# Patient Record
Sex: MALE
[De-identification: names, ages, dates, MRNs, and addresses within clinical notes are randomized per-mention and may not be internally consistent; named-entity substitution may affect disease eponyms.]

---

## 2021-07-21 ENCOUNTER — TRANSCRIPTION ENCOUNTER (OUTPATIENT)
Age: 56
End: 2021-07-21

## 2021-11-13 ENCOUNTER — NON-APPOINTMENT (OUTPATIENT)
Age: 56
End: 2021-11-13

## 2021-11-17 ENCOUNTER — TRANSCRIPTION ENCOUNTER (OUTPATIENT)
Age: 56
End: 2021-11-17

## 2021-11-17 ENCOUNTER — APPOINTMENT (OUTPATIENT)
Dept: CARDIOLOGY | Facility: CLINIC | Age: 56
End: 2021-11-17
Payer: COMMERCIAL

## 2021-11-17 ENCOUNTER — NON-APPOINTMENT (OUTPATIENT)
Age: 56
End: 2021-11-17

## 2021-11-17 VITALS
WEIGHT: 86 LBS | SYSTOLIC BLOOD PRESSURE: 130 MMHG | HEIGHT: 60 IN | DIASTOLIC BLOOD PRESSURE: 90 MMHG | OXYGEN SATURATION: 98 % | BODY MASS INDEX: 16.88 KG/M2 | HEART RATE: 114 BPM

## 2021-11-17 DIAGNOSIS — Z00.00 ENCOUNTER FOR GENERAL ADULT MEDICAL EXAMINATION W/OUT ABNORMAL FINDINGS: ICD-10-CM

## 2021-11-17 PROCEDURE — 99243 OFF/OP CNSLTJ NEW/EST LOW 30: CPT

## 2021-11-17 PROCEDURE — 93242 EXT ECG>48HR<7D RECORDING: CPT

## 2021-11-17 PROCEDURE — 93000 ELECTROCARDIOGRAM COMPLETE: CPT | Mod: 59

## 2021-11-17 RX ORDER — VALSARTAN 80 MG/1
80 TABLET, COATED ORAL DAILY
Refills: 0 | Status: ACTIVE | COMMUNITY

## 2021-11-17 RX ORDER — ATORVASTATIN CALCIUM 10 MG/1
10 TABLET, FILM COATED ORAL
Refills: 0 | Status: ACTIVE | COMMUNITY

## 2021-11-17 NOTE — PHYSICAL EXAM
[General Appearance - Well Developed] : well developed [Normal Appearance] : normal appearance [Well Groomed] : well groomed [General Appearance - Well Nourished] : well nourished [No Deformities] : no deformities [General Appearance - In No Acute Distress] : no acute distress [Normal Conjunctiva] : the conjunctiva exhibited no abnormalities [Eyelids - No Xanthelasma] : the eyelids demonstrated no xanthelasmas [Normal Oral Mucosa] : normal oral mucosa [No Oral Pallor] : no oral pallor [No Oral Cyanosis] : no oral cyanosis [Normal Jugular Venous A Waves Present] : normal jugular venous A waves present [Normal Jugular Venous V Waves Present] : normal jugular venous V waves present [No Jugular Venous Acuna A Waves] : no jugular venous acuna A waves [Respiration, Rhythm And Depth] : normal respiratory rhythm and effort [Exaggerated Use Of Accessory Muscles For Inspiration] : no accessory muscle use [Auscultation Breath Sounds / Voice Sounds] : lungs were clear to auscultation bilaterally [Heart Rate And Rhythm] : heart rate and rhythm were normal [Heart Sounds] : normal S1 and S2 [Murmurs] : no murmurs present [Abdomen Soft] : soft [Abdomen Tenderness] : non-tender [Abdomen Mass (___ Cm)] : no abdominal mass palpated [Abnormal Walk] : normal gait [Gait - Sufficient For Exercise Testing] : the gait was sufficient for exercise testing [Nail Clubbing] : no clubbing of the fingernails [Cyanosis, Localized] : no localized cyanosis [Petechial Hemorrhages (___cm)] : no petechial hemorrhages [Skin Color & Pigmentation] : normal skin color and pigmentation [] : no rash [No Venous Stasis] : no venous stasis [Skin Lesions] : no skin lesions [No Skin Ulcers] : no skin ulcer [No Xanthoma] : no  xanthoma was observed [Oriented To Time, Place, And Person] : oriented to person, place, and time [Affect] : the affect was normal [Mood] : the mood was normal [No Anxiety] : not feeling anxious

## 2021-11-30 ENCOUNTER — APPOINTMENT (OUTPATIENT)
Dept: CARDIOLOGY | Facility: CLINIC | Age: 56
End: 2021-11-30
Payer: COMMERCIAL

## 2021-11-30 PROCEDURE — 93306 TTE W/DOPPLER COMPLETE: CPT

## 2021-12-07 NOTE — HISTORY OF PRESENT ILLNESS
[Preoperative Visit] : for a medical evaluation prior to surgery [Scheduled Procedure ___] : a [unfilled] [Surgeon Name ___] : surgeon: [unfilled] [Good] : Good [Electrocardiogram] : ~T an ECG ~C was performed [Echocardiogram] : ~T an echocardiogram ~C was performed [Fair] : Fair [FreeTextEntry1] : 56-year-old male referred for complaints of tachyarrhythmias.  Patient has known history of hypertension, hyperlipidemia.  History of lithotripsy in July, 2021.  Nondrinker, non-smoker.  Denies any history of chest pain, dyspnea on exertion, diaphoretic spells, syncopal episodes.  No prior history of coronary disease.  States that his most recent labs were reportedly normal.  Has been on statins and angiotensin receptor blockers for a while.  Family history positive for grandmother who had myocardial infarction at the age of 71.

## 2021-12-07 NOTE — DISCUSSION/SUMMARY
[Procedure Low Risk] : the procedure risk is low [As per surgery] : as per surgery [Continue] : Continue medications as currently directed [Patient Low Risk] : the patient is a low surgical risk [Optimized for Surgery] : the patient is optimized for surgery [FreeTextEntry1] : Sinus tachycardia noted on routine electrocardiogram in 56-year-old male with history of hypertension and hyperlipidemia.  EKG shows no evidence of ischemia.  Hemodynamically stable otherwise.  No cardiac contraindications to planned low risk surgical intervention.  \par \par Holter monitor showed brief SVT but otherwise normal HR's (SR with mean Hr of 96 bpm).  Labs recently done by primary care physician were reviewed with the patient and show a normal CBC SMA-7 noteworthy for borderline elevation of creatinine, minimally elevated LFTs no thyroid function available for review.  \par \par Suggest assess thyroid function to rule out hyperthyroidism as cause of tachyarrhythmias.  Given age and multiple cardiac risk factors will request exercise nuclear stress test to rule out occult ischemic heart disease.

## 2021-12-15 ENCOUNTER — APPOINTMENT (OUTPATIENT)
Dept: CARDIOLOGY | Facility: CLINIC | Age: 56
End: 2021-12-15
Payer: COMMERCIAL

## 2021-12-15 PROCEDURE — 93015 CV STRESS TEST SUPVJ I&R: CPT

## 2021-12-15 PROCEDURE — 78452 HT MUSCLE IMAGE SPECT MULT: CPT

## 2021-12-15 PROCEDURE — A9500: CPT

## 2022-04-14 ENCOUNTER — APPOINTMENT (OUTPATIENT)
Dept: CARDIOLOGY | Facility: CLINIC | Age: 57
End: 2022-04-14
Payer: COMMERCIAL

## 2022-04-14 ENCOUNTER — NON-APPOINTMENT (OUTPATIENT)
Age: 57
End: 2022-04-14

## 2022-04-14 VITALS
OXYGEN SATURATION: 97 % | HEIGHT: 60 IN | BODY MASS INDEX: 17.47 KG/M2 | DIASTOLIC BLOOD PRESSURE: 88 MMHG | SYSTOLIC BLOOD PRESSURE: 100 MMHG | WEIGHT: 89 LBS | HEART RATE: 108 BPM

## 2022-04-14 PROCEDURE — 99213 OFFICE O/P EST LOW 20 MIN: CPT

## 2022-04-14 PROCEDURE — 93000 ELECTROCARDIOGRAM COMPLETE: CPT

## 2022-04-14 NOTE — DISCUSSION/SUMMARY
[___ Year(s)] : in [unfilled] year(s) [FreeTextEntry1] : Holter monitor showed brief SVT but otherwise normal HR's (SR with mean Hr of 96 bpm).  Short UT noted on serial ecg's but no evidence of SVT's. \par \par Followuip yearly, if palpitations increase may need lop recorder.

## 2022-04-14 NOTE — CARDIOLOGY SUMMARY
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  -Left atrial enlargement.  [de-identified] : 11/17/2021, no evidence of palpitations.  Holter demonstrated sinus rhythm with isolated brief supraventricular tachyarrhythmia (asymptomatic). [de-identified] : 12/15/2021,normal exercise tolerance.  Normal exercise nuclear stress test. [de-identified] : 11/30/2021, ejection fraction 68%, mild concentric remodeling of the left ventricle, no significant valvular heart disease.

## 2022-04-14 NOTE — HISTORY OF PRESENT ILLNESS
[FreeTextEntry1] : 56-year-old male seen previously for complaints of tachyarrhythmias.  Patient has known history of hypertension, hyperlipidemia.  History of lithotripsy in July, 2021.  Nondrinker, non-smoker.  Denies any history of chest pain, dyspnea on exertion, diaphoretic spells, syncopal episodes.  No prior history of coronary disease.  Family history positive for grandmother who had myocardial infarction at the age of 71.

## 2023-04-17 ENCOUNTER — APPOINTMENT (OUTPATIENT)
Dept: CARDIOLOGY | Facility: CLINIC | Age: 58
End: 2023-04-17
Payer: COMMERCIAL

## 2023-04-17 ENCOUNTER — NON-APPOINTMENT (OUTPATIENT)
Age: 58
End: 2023-04-17

## 2023-04-17 VITALS
OXYGEN SATURATION: 98 % | HEIGHT: 60 IN | HEART RATE: 94 BPM | DIASTOLIC BLOOD PRESSURE: 70 MMHG | WEIGHT: 87 LBS | BODY MASS INDEX: 17.08 KG/M2 | SYSTOLIC BLOOD PRESSURE: 110 MMHG

## 2023-04-17 DIAGNOSIS — I45.6 PRE-EXCITATION SYNDROME: ICD-10-CM

## 2023-04-17 DIAGNOSIS — E78.5 HYPERLIPIDEMIA, UNSPECIFIED: ICD-10-CM

## 2023-04-17 PROCEDURE — 99213 OFFICE O/P EST LOW 20 MIN: CPT | Mod: 25

## 2023-04-17 PROCEDURE — 93000 ELECTROCARDIOGRAM COMPLETE: CPT

## 2023-04-17 RX ORDER — TAMSULOSIN HYDROCHLORIDE 0.4 MG/1
0.4 CAPSULE ORAL DAILY
Refills: 0 | Status: DISCONTINUED | COMMUNITY
End: 2023-04-17

## 2023-04-17 NOTE — DISCUSSION/SUMMARY
[___ Year(s)] : in [unfilled] year(s) [EKG obtained to assist in diagnosis and management of assessed problem(s)] : EKG obtained to assist in diagnosis and management of assessed problem(s) [FreeTextEntry1] : Holter monitor had shown brief SVT but otherwise normal HR's (SR with mean Hr of 96 bpm).  Short AK noted on serial ecg's but no evidence of SVT's. Asked patient to self monitor his pulses and report at next visit. He was reassured that Hr's in the low 100's are acceptable and if he is asymptomatic, do not require intervention at this time.\par Will get copy of labs from PCP\par Followup yearly, if palpitations increase may need lop recorder.

## 2023-04-17 NOTE — CARDIOLOGY SUMMARY
[de-identified] : 4/17/23, Sinus  Rhythm  -Short IA syndrome , -Left atrial enlargement.  -Anteroseptal infarct -age undetermined. 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  -Left atrial enlargement.  [de-identified] : 11/17/2021, no evidence of palpitations.  Holter demonstrated sinus rhythm with isolated brief supraventricular tachyarrhythmia (asymptomatic). [de-identified] : 12/15/2021,normal exercise tolerance.  Normal exercise nuclear stress test. [de-identified] : 11/30/2021, ejection fraction 68%, mild concentric remodeling of the left ventricle, no significant valvular heart disease.

## 2023-04-17 NOTE — HISTORY OF PRESENT ILLNESS
[FreeTextEntry1] : 57-year-old male seen previously for complaints of tachyarrhythmias.  As per patient noted to have RVR's on ECG done at [facility] several weeks ago.\par Patient has known history of hypertension, hyperlipidemia.  \par  Nondrinker, non-smoker.  Denies any history of chest pain, dyspnea on exertion, diaphoretic spells, syncopal episodes.  No prior history of coronary disease.  Family history positive for grandmother who had myocardial infarction at the age of 71.

## 2024-01-19 ENCOUNTER — NON-APPOINTMENT (OUTPATIENT)
Age: 59
End: 2024-01-19

## 2024-04-17 ENCOUNTER — APPOINTMENT (OUTPATIENT)
Dept: CARDIOLOGY | Facility: CLINIC | Age: 59
End: 2024-04-17
Payer: COMMERCIAL

## 2024-04-17 ENCOUNTER — NON-APPOINTMENT (OUTPATIENT)
Age: 59
End: 2024-04-17

## 2024-04-17 VITALS
HEART RATE: 100 BPM | HEIGHT: 60 IN | OXYGEN SATURATION: 98 % | WEIGHT: 85.98 LBS | DIASTOLIC BLOOD PRESSURE: 76 MMHG | BODY MASS INDEX: 16.88 KG/M2 | SYSTOLIC BLOOD PRESSURE: 128 MMHG

## 2024-04-17 DIAGNOSIS — I10 ESSENTIAL (PRIMARY) HYPERTENSION: ICD-10-CM

## 2024-04-17 DIAGNOSIS — R00.0 TACHYCARDIA, UNSPECIFIED: ICD-10-CM

## 2024-04-17 PROCEDURE — 99213 OFFICE O/P EST LOW 20 MIN: CPT

## 2024-04-17 PROCEDURE — 93000 ELECTROCARDIOGRAM COMPLETE: CPT

## 2024-04-17 PROCEDURE — G2211 COMPLEX E/M VISIT ADD ON: CPT

## 2024-04-17 NOTE — DISCUSSION/SUMMARY
[___ Year(s)] : in [unfilled] year(s) [FreeTextEntry1] : Holter monitor had shown brief SVT but otherwise normal HR's (SR with mean HR of 96 bpm).  Short FL noted on serial ecg's but no evidence of SVT's.  May be having increased SVT's, offered repeat Holter but patient says that they haven't changed in frequency so will defer monitoring. BP control adequate, remains active and otherwise asymptomatic. Will get copy of labs from PCP from 4 months ago. Followup yearly, if palpitations increase may need lop recorder. [EKG obtained to assist in diagnosis and management of assessed problem(s)] : EKG obtained to assist in diagnosis and management of assessed problem(s)

## 2024-04-17 NOTE — HISTORY OF PRESENT ILLNESS
[FreeTextEntry1] : 58-year-old male seen previously for complaints of tachyarrhythmias.   As per patient noted to have RVR's on ECG done at [facility] several weeks ago. Still having infrequent bouts of palpitations at rest. Patient has known history of hypertension, hyperlipidemia.   Nondrinker, non-smoker.  Denies any history of chest pain, dyspnea on exertion, diaphoretic spells, syncopal episodes.  No prior history of coronary disease.  Family history positive for grandmother who had myocardial infarction at the age of 71.

## 2024-04-17 NOTE — CARDIOLOGY SUMMARY
[de-identified] : 4/17/24, Sinus Rhythm -Short NV syndrome  -Anteroseptal infarct -age undetermined. 4/17/23, Sinus  Rhythm  -Short NV syndrome , -Left atrial enlargement.  -Anteroseptal infarct -age undetermined.  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  -Left atrial enlargement.  [de-identified] : 11/17/2021, no evidence of palpitations.  Holter demonstrated sinus rhythm with isolated brief supraventricular tachyarrhythmia (asymptomatic). [de-identified] : 12/15/2021,normal exercise tolerance.  Normal exercise nuclear stress test. [de-identified] : 11/30/2021, ejection fraction 68%, mild concentric remodeling of the left ventricle, no significant valvular heart disease.

## 2025-05-29 ENCOUNTER — APPOINTMENT (OUTPATIENT)
Dept: CARDIOLOGY | Facility: CLINIC | Age: 60
End: 2025-05-29

## 2025-08-12 ENCOUNTER — NON-APPOINTMENT (OUTPATIENT)
Age: 60
End: 2025-08-12

## 2025-08-14 ENCOUNTER — NON-APPOINTMENT (OUTPATIENT)
Age: 60
End: 2025-08-14

## 2025-08-14 ENCOUNTER — APPOINTMENT (OUTPATIENT)
Dept: CARDIOLOGY | Facility: CLINIC | Age: 60
End: 2025-08-14
Payer: COMMERCIAL

## 2025-08-14 VITALS
HEART RATE: 86 BPM | BODY MASS INDEX: 17.47 KG/M2 | RESPIRATION RATE: 16 BRPM | WEIGHT: 89 LBS | HEIGHT: 60 IN | SYSTOLIC BLOOD PRESSURE: 118 MMHG | DIASTOLIC BLOOD PRESSURE: 80 MMHG | OXYGEN SATURATION: 98 %

## 2025-08-14 DIAGNOSIS — I45.6 PRE-EXCITATION SYNDROME: ICD-10-CM

## 2025-08-14 DIAGNOSIS — R00.0 TACHYCARDIA, UNSPECIFIED: ICD-10-CM

## 2025-08-14 DIAGNOSIS — E78.5 HYPERLIPIDEMIA, UNSPECIFIED: ICD-10-CM

## 2025-08-14 DIAGNOSIS — I10 ESSENTIAL (PRIMARY) HYPERTENSION: ICD-10-CM

## 2025-08-14 PROCEDURE — 99214 OFFICE O/P EST MOD 30 MIN: CPT

## 2025-08-14 PROCEDURE — 93000 ELECTROCARDIOGRAM COMPLETE: CPT

## 2025-08-14 PROCEDURE — G2211 COMPLEX E/M VISIT ADD ON: CPT | Mod: NC

## 2025-08-14 RX ORDER — CHLORHEXIDINE GLUCONATE 4 %
LIQUID (ML) TOPICAL
Refills: 0 | Status: ACTIVE | COMMUNITY